# Patient Record
Sex: FEMALE | Race: WHITE | NOT HISPANIC OR LATINO | URBAN - METROPOLITAN AREA
[De-identification: names, ages, dates, MRNs, and addresses within clinical notes are randomized per-mention and may not be internally consistent; named-entity substitution may affect disease eponyms.]

---

## 2018-06-22 NOTE — PATIENT PROFILE ADULT. - PSH
History of cholecystectomy  delivery delivered    History of cholecystectomy    S/P bilateral breast reduction

## 2018-06-25 ENCOUNTER — INPATIENT (INPATIENT)
Facility: HOSPITAL | Age: 48
LOS: 0 days | Discharge: HOME | DRG: 621 | End: 2018-06-26
Attending: SURGERY | Admitting: SURGERY
Payer: COMMERCIAL

## 2018-06-25 VITALS
SYSTOLIC BLOOD PRESSURE: 143 MMHG | OXYGEN SATURATION: 100 % | HEART RATE: 115 BPM | TEMPERATURE: 98 F | RESPIRATION RATE: 18 BRPM | DIASTOLIC BLOOD PRESSURE: 81 MMHG | HEIGHT: 63 IN | WEIGHT: 282.85 LBS

## 2018-06-25 DIAGNOSIS — Z90.49 ACQUIRED ABSENCE OF OTHER SPECIFIED PARTS OF DIGESTIVE TRACT: Chronic | ICD-10-CM

## 2018-06-25 DIAGNOSIS — Z98.890 OTHER SPECIFIED POSTPROCEDURAL STATES: Chronic | ICD-10-CM

## 2018-06-25 LAB
BLD GP AB SCN SERPL QL: NEGATIVE — SIGNIFICANT CHANGE UP
GLUCOSE BLDC GLUCOMTR-MCNC: 106 MG/DL — HIGH (ref 70–99)
GLUCOSE BLDC GLUCOMTR-MCNC: 128 MG/DL — HIGH (ref 70–99)
GLUCOSE BLDC GLUCOMTR-MCNC: 145 MG/DL — HIGH (ref 70–99)
GLUCOSE BLDC GLUCOMTR-MCNC: 156 MG/DL — HIGH (ref 70–99)
HCT VFR BLD CALC: 35.6 % — SIGNIFICANT CHANGE UP (ref 34.5–45)
HGB BLD-MCNC: 11.5 G/DL — SIGNIFICANT CHANGE UP (ref 11.5–15.5)
MCHC RBC-ENTMCNC: 26.8 PG — LOW (ref 27–34)
MCHC RBC-ENTMCNC: 32.3 G/DL — SIGNIFICANT CHANGE UP (ref 32–36)
MCV RBC AUTO: 83 FL — SIGNIFICANT CHANGE UP (ref 80–100)
PLATELET # BLD AUTO: 379 K/UL — SIGNIFICANT CHANGE UP (ref 150–400)
RBC # BLD: 4.29 M/UL — SIGNIFICANT CHANGE UP (ref 3.8–5.2)
RBC # FLD: 13.5 % — SIGNIFICANT CHANGE UP (ref 10.3–16.9)
RH IG SCN BLD-IMP: POSITIVE — SIGNIFICANT CHANGE UP
WBC # BLD: 12.8 K/UL — HIGH (ref 3.8–10.5)
WBC # FLD AUTO: 12.8 K/UL — HIGH (ref 3.8–10.5)

## 2018-06-25 RX ORDER — GLUCAGON INJECTION, SOLUTION 0.5 MG/.1ML
1 INJECTION, SOLUTION SUBCUTANEOUS ONCE
Qty: 0 | Refills: 0 | Status: DISCONTINUED | OUTPATIENT
Start: 2018-06-25 | End: 2018-06-25

## 2018-06-25 RX ORDER — DEXTROSE 50 % IN WATER 50 %
15 SYRINGE (ML) INTRAVENOUS ONCE
Qty: 0 | Refills: 0 | Status: DISCONTINUED | OUTPATIENT
Start: 2018-06-25 | End: 2018-06-25

## 2018-06-25 RX ORDER — DEXTROSE 50 % IN WATER 50 %
15 SYRINGE (ML) INTRAVENOUS ONCE
Qty: 0 | Refills: 0 | Status: DISCONTINUED | OUTPATIENT
Start: 2018-06-25 | End: 2018-06-26

## 2018-06-25 RX ORDER — DEXTROSE 50 % IN WATER 50 %
25 SYRINGE (ML) INTRAVENOUS ONCE
Qty: 0 | Refills: 0 | Status: DISCONTINUED | OUTPATIENT
Start: 2018-06-25 | End: 2018-06-26

## 2018-06-25 RX ORDER — DEXTROSE 50 % IN WATER 50 %
25 SYRINGE (ML) INTRAVENOUS ONCE
Qty: 0 | Refills: 0 | Status: DISCONTINUED | OUTPATIENT
Start: 2018-06-25 | End: 2018-06-25

## 2018-06-25 RX ORDER — SODIUM CHLORIDE 9 MG/ML
1000 INJECTION, SOLUTION INTRAVENOUS
Qty: 0 | Refills: 0 | Status: DISCONTINUED | OUTPATIENT
Start: 2018-06-25 | End: 2018-06-26

## 2018-06-25 RX ORDER — DEXTROSE 50 % IN WATER 50 %
12.5 SYRINGE (ML) INTRAVENOUS ONCE
Qty: 0 | Refills: 0 | Status: DISCONTINUED | OUTPATIENT
Start: 2018-06-25 | End: 2018-06-26

## 2018-06-25 RX ORDER — HYDROMORPHONE HYDROCHLORIDE 2 MG/ML
0.5 INJECTION INTRAMUSCULAR; INTRAVENOUS; SUBCUTANEOUS EVERY 4 HOURS
Qty: 0 | Refills: 0 | Status: DISCONTINUED | OUTPATIENT
Start: 2018-06-25 | End: 2018-06-26

## 2018-06-25 RX ORDER — DEXTROSE 50 % IN WATER 50 %
12.5 SYRINGE (ML) INTRAVENOUS ONCE
Qty: 0 | Refills: 0 | Status: DISCONTINUED | OUTPATIENT
Start: 2018-06-25 | End: 2018-06-25

## 2018-06-25 RX ORDER — ONDANSETRON 8 MG/1
4 TABLET, FILM COATED ORAL EVERY 6 HOURS
Qty: 0 | Refills: 0 | Status: DISCONTINUED | OUTPATIENT
Start: 2018-06-25 | End: 2018-06-26

## 2018-06-25 RX ORDER — PANTOPRAZOLE SODIUM 20 MG/1
40 TABLET, DELAYED RELEASE ORAL EVERY 24 HOURS
Qty: 0 | Refills: 0 | Status: DISCONTINUED | OUTPATIENT
Start: 2018-06-25 | End: 2018-06-26

## 2018-06-25 RX ORDER — AMLODIPINE BESYLATE 2.5 MG/1
10 TABLET ORAL DAILY
Qty: 0 | Refills: 0 | Status: DISCONTINUED | OUTPATIENT
Start: 2018-06-25 | End: 2018-06-26

## 2018-06-25 RX ORDER — AMLODIPINE BESYLATE 2.5 MG/1
1 TABLET ORAL
Qty: 0 | Refills: 0 | COMMUNITY

## 2018-06-25 RX ORDER — ENOXAPARIN SODIUM 100 MG/ML
40 INJECTION SUBCUTANEOUS ONCE
Qty: 0 | Refills: 0 | Status: COMPLETED | OUTPATIENT
Start: 2018-06-25 | End: 2018-06-25

## 2018-06-25 RX ORDER — SCOPALAMINE 1 MG/3D
1 PATCH, EXTENDED RELEASE TRANSDERMAL ONCE
Qty: 0 | Refills: 0 | Status: COMPLETED | OUTPATIENT
Start: 2018-06-25 | End: 2018-06-25

## 2018-06-25 RX ORDER — HYDROMORPHONE HYDROCHLORIDE 2 MG/ML
1 INJECTION INTRAMUSCULAR; INTRAVENOUS; SUBCUTANEOUS EVERY 4 HOURS
Qty: 0 | Refills: 0 | Status: DISCONTINUED | OUTPATIENT
Start: 2018-06-25 | End: 2018-06-26

## 2018-06-25 RX ORDER — LABETALOL HCL 100 MG
10 TABLET ORAL ONCE
Qty: 0 | Refills: 0 | Status: COMPLETED | OUTPATIENT
Start: 2018-06-25 | End: 2018-06-25

## 2018-06-25 RX ORDER — SODIUM CHLORIDE 9 MG/ML
1000 INJECTION, SOLUTION INTRAVENOUS
Qty: 0 | Refills: 0 | Status: DISCONTINUED | OUTPATIENT
Start: 2018-06-25 | End: 2018-06-25

## 2018-06-25 RX ORDER — GLUCAGON INJECTION, SOLUTION 0.5 MG/.1ML
1 INJECTION, SOLUTION SUBCUTANEOUS ONCE
Qty: 0 | Refills: 0 | Status: DISCONTINUED | OUTPATIENT
Start: 2018-06-25 | End: 2018-06-26

## 2018-06-25 RX ORDER — BUPIVACAINE 13.3 MG/ML
20 INJECTION, SUSPENSION, LIPOSOMAL INFILTRATION ONCE
Qty: 0 | Refills: 0 | Status: DISCONTINUED | OUTPATIENT
Start: 2018-06-25 | End: 2018-06-25

## 2018-06-25 RX ORDER — AMLODIPINE BESYLATE 2.5 MG/1
10 TABLET ORAL DAILY
Qty: 0 | Refills: 0 | Status: DISCONTINUED | OUTPATIENT
Start: 2018-06-25 | End: 2018-06-25

## 2018-06-25 RX ORDER — INSULIN LISPRO 100/ML
VIAL (ML) SUBCUTANEOUS
Qty: 0 | Refills: 0 | Status: DISCONTINUED | OUTPATIENT
Start: 2018-06-25 | End: 2018-06-26

## 2018-06-25 RX ORDER — INSULIN LISPRO 100/ML
VIAL (ML) SUBCUTANEOUS
Qty: 0 | Refills: 0 | Status: DISCONTINUED | OUTPATIENT
Start: 2018-06-25 | End: 2018-06-25

## 2018-06-25 RX ORDER — HEPARIN SODIUM 5000 [USP'U]/ML
5000 INJECTION INTRAVENOUS; SUBCUTANEOUS EVERY 8 HOURS
Qty: 0 | Refills: 0 | Status: DISCONTINUED | OUTPATIENT
Start: 2018-06-26 | End: 2018-06-26

## 2018-06-25 RX ADMIN — HYDROMORPHONE HYDROCHLORIDE 0.5 MILLIGRAM(S): 2 INJECTION INTRAMUSCULAR; INTRAVENOUS; SUBCUTANEOUS at 22:20

## 2018-06-25 RX ADMIN — PANTOPRAZOLE SODIUM 40 MILLIGRAM(S): 20 TABLET, DELAYED RELEASE ORAL at 13:13

## 2018-06-25 RX ADMIN — Medication 2: at 12:54

## 2018-06-25 RX ADMIN — HYDROMORPHONE HYDROCHLORIDE 0.5 MILLIGRAM(S): 2 INJECTION INTRAMUSCULAR; INTRAVENOUS; SUBCUTANEOUS at 17:43

## 2018-06-25 RX ADMIN — SCOPALAMINE 1 PATCH: 1 PATCH, EXTENDED RELEASE TRANSDERMAL at 07:58

## 2018-06-25 RX ADMIN — Medication 10 MILLIGRAM(S): at 14:34

## 2018-06-25 RX ADMIN — AMLODIPINE BESYLATE 10 MILLIGRAM(S): 2.5 TABLET ORAL at 17:43

## 2018-06-25 RX ADMIN — HYDROMORPHONE HYDROCHLORIDE 0.5 MILLIGRAM(S): 2 INJECTION INTRAMUSCULAR; INTRAVENOUS; SUBCUTANEOUS at 12:34

## 2018-06-25 RX ADMIN — HYDROMORPHONE HYDROCHLORIDE 0.5 MILLIGRAM(S): 2 INJECTION INTRAMUSCULAR; INTRAVENOUS; SUBCUTANEOUS at 18:00

## 2018-06-25 RX ADMIN — HYDROMORPHONE HYDROCHLORIDE 0.5 MILLIGRAM(S): 2 INJECTION INTRAMUSCULAR; INTRAVENOUS; SUBCUTANEOUS at 12:57

## 2018-06-25 RX ADMIN — Medication 10 MILLIGRAM(S): at 14:00

## 2018-06-25 RX ADMIN — SODIUM CHLORIDE 200 MILLILITER(S): 9 INJECTION, SOLUTION INTRAVENOUS at 13:34

## 2018-06-25 RX ADMIN — HYDROMORPHONE HYDROCHLORIDE 0.5 MILLIGRAM(S): 2 INJECTION INTRAMUSCULAR; INTRAVENOUS; SUBCUTANEOUS at 22:07

## 2018-06-25 RX ADMIN — ENOXAPARIN SODIUM 40 MILLIGRAM(S): 100 INJECTION SUBCUTANEOUS at 07:58

## 2018-06-25 NOTE — BRIEF OPERATIVE NOTE - PROCEDURE
<<-----Click on this checkbox to enter Procedure Laparoscopic sleeve gastrectomy  06/25/2018    Active  ENORTHFIEL

## 2018-06-25 NOTE — H&P ADULT - ASSESSMENT
47y female with PMH HTN, DM with BMI 50 s/p elective sleeve gastrectomy.     Preop hgb 12.3  Pain/nausea control  BCLD/IVF  UGI series (Date)  VTE ppx  - Heparin subQ q8 at night  OOB/Amb/SCDs  CBC 6 hours postop  Nutrition Consult  f/u AM labs

## 2018-06-25 NOTE — H&P ADULT - HISTORY OF PRESENT ILLNESS
47y female PMH HTN, DM who has failed weight loss with long term dieting and exercise. She is here for elective sleeve gastrectomy. She states she feels well today.     PMH: HTN, DM    Medications: Amlodipine, Metformin    PShx: Breast reduction, CS, Cholecystectomy     NKDA, shellfish - itchy, respiratory distress    Fhx: Mother - DM    Never smoker, never drinker, no drug use    ROS: o/w negative    PHYSICAL EXAM:  GENERAL:  NAD  NEURO: AOx3. No focal deficit.   HEENT: AT, NC, sclera anicteric, mucosa moist  CV: RRR, no MRG  RESP: CTAB, no wheezes/rales/rhonchi   GI: soft, ND, NT, +BS, no guarding or rebound  : voiding	  MUSC: strength, sensation grossly intact.   Ext: No cyanosis, edema noted. Pulses 2+ bilateral UE/LE.

## 2018-06-25 NOTE — PROGRESS NOTE ADULT - SUBJECTIVE AND OBJECTIVE BOX
Surgery Post-Op Note, PCN:     Pre-Op Dx: E66.01  Morbid obesity    Procedure: Laparoscopic sleeve gastrectomy    Surgeon: Trevon    Subjective: Patient seen by bedside. Has not urinated yet. Pain well-controlled. Denies nausea, vomiting, chest pain, shortness of breath, fevers, or chills.      Vital Signs Last 24 Hrs  T(C): 36.3 (25 Jun 2018 14:18), Max: 37.3 (25 Jun 2018 12:05)  T(F): 97.4 (25 Jun 2018 14:18), Max: 99.1 (25 Jun 2018 12:05)  HR: 90 (25 Jun 2018 14:18) (86 - 115)  BP: 158/84 (25 Jun 2018 14:18) (135/82 - 166/88)  BP(mean): 105 (25 Jun 2018 13:57) (99 - 114)  RR: 18 (25 Jun 2018 14:18) (15 - 28)  SpO2: 99% (25 Jun 2018 14:18) (94% - 100%)    Physical Exam:  Gen:  NAD, resting comfortably, obese  Pulm:  no respiratory distress, nonlabored breathing  C/V: normal sinus rhythm, RRR  Abd: soft, NT/ND. obese, lap incision CDI  Extrem: WWP, non-edematous, scds in place        LABS:            CAPILLARY BLOOD GLUCOSE      POCT Blood Glucose.: 156 mg/dL (25 Jun 2018 12:29)  POCT Blood Glucose.: 106 mg/dL (25 Jun 2018 07:32)          Radiology and Additional Studies:

## 2018-06-25 NOTE — PROGRESS NOTE ADULT - ASSESSMENT
47y female PMH HTN, DM who has failed weight loss with long term dieting and exercise. Now s/p laparoscopic sleeve gastrectomy and hiatal hernia repair    Pain/nausea control  BCLD/IVF  VTE ppx  - Asa81 x 30 days  OOB/Amb/SCDs  f/u AM labs    Discharge w/:  - Colace 100 mg BID  - Protonix 40 mg qd  - Percocet 20 tabs  - Levsin 0.125 mg q4h for 7 days  - Zofran 4 mg BID for 7 days

## 2018-06-26 VITALS — WEIGHT: 282.85 LBS

## 2018-06-26 LAB
ANION GAP SERPL CALC-SCNC: 17 MMOL/L — SIGNIFICANT CHANGE UP (ref 5–17)
BUN SERPL-MCNC: 5 MG/DL — LOW (ref 7–23)
CALCIUM SERPL-MCNC: 9.3 MG/DL — SIGNIFICANT CHANGE UP (ref 8.4–10.5)
CHLORIDE SERPL-SCNC: 98 MMOL/L — SIGNIFICANT CHANGE UP (ref 96–108)
CO2 SERPL-SCNC: 21 MMOL/L — LOW (ref 22–31)
CREAT SERPL-MCNC: 0.54 MG/DL — SIGNIFICANT CHANGE UP (ref 0.5–1.3)
GLUCOSE BLDC GLUCOMTR-MCNC: 125 MG/DL — HIGH (ref 70–99)
GLUCOSE BLDC GLUCOMTR-MCNC: 133 MG/DL — HIGH (ref 70–99)
GLUCOSE SERPL-MCNC: 124 MG/DL — HIGH (ref 70–99)
HBA1C BLD-MCNC: 5.7 % — HIGH (ref 4–5.6)
HCT VFR BLD CALC: 35.4 % — SIGNIFICANT CHANGE UP (ref 34.5–45)
HGB BLD-MCNC: 11.5 G/DL — SIGNIFICANT CHANGE UP (ref 11.5–15.5)
MAGNESIUM SERPL-MCNC: 1.9 MG/DL — SIGNIFICANT CHANGE UP (ref 1.6–2.6)
MCHC RBC-ENTMCNC: 26.7 PG — LOW (ref 27–34)
MCHC RBC-ENTMCNC: 32.5 G/DL — SIGNIFICANT CHANGE UP (ref 32–36)
MCV RBC AUTO: 82.1 FL — SIGNIFICANT CHANGE UP (ref 80–100)
PHOSPHATE SERPL-MCNC: 2.3 MG/DL — LOW (ref 2.5–4.5)
PLATELET # BLD AUTO: 371 K/UL — SIGNIFICANT CHANGE UP (ref 150–400)
POTASSIUM SERPL-MCNC: 3.4 MMOL/L — LOW (ref 3.5–5.3)
POTASSIUM SERPL-SCNC: 3.4 MMOL/L — LOW (ref 3.5–5.3)
RBC # BLD: 4.31 M/UL — SIGNIFICANT CHANGE UP (ref 3.8–5.2)
RBC # FLD: 13.5 % — SIGNIFICANT CHANGE UP (ref 10.3–16.9)
SODIUM SERPL-SCNC: 136 MMOL/L — SIGNIFICANT CHANGE UP (ref 135–145)
WBC # BLD: 10.6 K/UL — HIGH (ref 3.8–10.5)
WBC # FLD AUTO: 10.6 K/UL — HIGH (ref 3.8–10.5)

## 2018-06-26 PROCEDURE — C1781: CPT

## 2018-06-26 PROCEDURE — 80048 BASIC METABOLIC PNL TOTAL CA: CPT

## 2018-06-26 PROCEDURE — 36415 COLL VENOUS BLD VENIPUNCTURE: CPT

## 2018-06-26 PROCEDURE — 86850 RBC ANTIBODY SCREEN: CPT

## 2018-06-26 PROCEDURE — 83735 ASSAY OF MAGNESIUM: CPT

## 2018-06-26 PROCEDURE — 88307 TISSUE EXAM BY PATHOLOGIST: CPT

## 2018-06-26 PROCEDURE — 84100 ASSAY OF PHOSPHORUS: CPT

## 2018-06-26 PROCEDURE — C1889: CPT

## 2018-06-26 PROCEDURE — 82962 GLUCOSE BLOOD TEST: CPT

## 2018-06-26 PROCEDURE — 85027 COMPLETE CBC AUTOMATED: CPT

## 2018-06-26 PROCEDURE — 83036 HEMOGLOBIN GLYCOSYLATED A1C: CPT

## 2018-06-26 PROCEDURE — 86901 BLOOD TYPING SEROLOGIC RH(D): CPT

## 2018-06-26 PROCEDURE — 86900 BLOOD TYPING SEROLOGIC ABO: CPT

## 2018-06-26 PROCEDURE — C9399: CPT

## 2018-06-26 RX ORDER — METFORMIN HYDROCHLORIDE 850 MG/1
0 TABLET ORAL
Qty: 0 | Refills: 0 | COMMUNITY

## 2018-06-26 RX ORDER — SODIUM,POTASSIUM PHOSPHATES 278-250MG
1 POWDER IN PACKET (EA) ORAL
Qty: 0 | Refills: 0 | Status: DISCONTINUED | OUTPATIENT
Start: 2018-06-26 | End: 2018-06-26

## 2018-06-26 RX ORDER — PANTOPRAZOLE SODIUM 20 MG/1
1 TABLET, DELAYED RELEASE ORAL
Qty: 30 | Refills: 0 | OUTPATIENT
Start: 2018-06-26 | End: 2018-07-25

## 2018-06-26 RX ORDER — HYOSCYAMINE SULFATE 0.13 MG
1 TABLET ORAL
Qty: 20 | Refills: 0 | OUTPATIENT
Start: 2018-06-26 | End: 2018-06-30

## 2018-06-26 RX ORDER — ASPIRIN/CALCIUM CARB/MAGNESIUM 324 MG
1 TABLET ORAL
Qty: 30 | Refills: 0 | OUTPATIENT
Start: 2018-06-26 | End: 2018-07-25

## 2018-06-26 RX ORDER — HYOSCYAMINE SULFATE 0.13 MG
0.12 TABLET ORAL EVERY 4 HOURS
Qty: 0 | Refills: 0 | Status: DISCONTINUED | OUTPATIENT
Start: 2018-06-26 | End: 2018-06-26

## 2018-06-26 RX ORDER — OXYCODONE AND ACETAMINOPHEN 5; 325 MG/1; MG/1
1 TABLET ORAL EVERY 4 HOURS
Qty: 0 | Refills: 0 | Status: DISCONTINUED | OUTPATIENT
Start: 2018-06-26 | End: 2018-06-26

## 2018-06-26 RX ORDER — DOCUSATE SODIUM 100 MG
1 CAPSULE ORAL
Qty: 10 | Refills: 0 | OUTPATIENT
Start: 2018-06-26 | End: 2018-06-30

## 2018-06-26 RX ORDER — OXYCODONE AND ACETAMINOPHEN 5; 325 MG/1; MG/1
2 TABLET ORAL EVERY 4 HOURS
Qty: 0 | Refills: 0 | Status: DISCONTINUED | OUTPATIENT
Start: 2018-06-26 | End: 2018-06-26

## 2018-06-26 RX ORDER — SODIUM CHLORIDE 9 MG/ML
1000 INJECTION, SOLUTION INTRAVENOUS
Qty: 0 | Refills: 0 | Status: DISCONTINUED | OUTPATIENT
Start: 2018-06-26 | End: 2018-06-26

## 2018-06-26 RX ORDER — ONDANSETRON 8 MG/1
1 TABLET, FILM COATED ORAL
Qty: 12 | Refills: 0 | OUTPATIENT
Start: 2018-06-26 | End: 2018-06-29

## 2018-06-26 RX ADMIN — HEPARIN SODIUM 5000 UNIT(S): 5000 INJECTION INTRAVENOUS; SUBCUTANEOUS at 13:22

## 2018-06-26 RX ADMIN — ONDANSETRON 4 MILLIGRAM(S): 8 TABLET, FILM COATED ORAL at 06:26

## 2018-06-26 RX ADMIN — AMLODIPINE BESYLATE 10 MILLIGRAM(S): 2.5 TABLET ORAL at 06:17

## 2018-06-26 RX ADMIN — HEPARIN SODIUM 5000 UNIT(S): 5000 INJECTION INTRAVENOUS; SUBCUTANEOUS at 06:17

## 2018-06-26 RX ADMIN — HYDROMORPHONE HYDROCHLORIDE 0.5 MILLIGRAM(S): 2 INJECTION INTRAMUSCULAR; INTRAVENOUS; SUBCUTANEOUS at 06:35

## 2018-06-26 RX ADMIN — HYDROMORPHONE HYDROCHLORIDE 0.5 MILLIGRAM(S): 2 INJECTION INTRAMUSCULAR; INTRAVENOUS; SUBCUTANEOUS at 06:17

## 2018-06-26 RX ADMIN — Medication 0.12 MILLIGRAM(S): at 11:19

## 2018-06-26 RX ADMIN — Medication 1 TABLET(S): at 11:20

## 2018-06-26 NOTE — DISCHARGE NOTE ADULT - MEDICATION SUMMARY - MEDICATIONS TO TAKE
I will START or STAY ON the medications listed below when I get home from the hospital:    aspirin 81 mg oral tablet, chewable  -- 1 tab(s) chewed once a day MDD:1  -- Chew tablets before swallowing  Take with food or milk.    -- Indication: For Postoperative antiplatelet     Percocet 5/325 oral tablet  -- 1 tab(s) by mouth every 6 hours, As Needed -for severe pain MDD:4   -- Caution federal law prohibits the transfer of this drug to any person other  than the person for whom it was prescribed.  May cause drowsiness.  Alcohol may intensify this effect.  Use care when operating dangerous machinery.  This prescription cannot be refilled.  This product contains acetaminophen.  Do not use  with any other product containing acetaminophen to prevent possible liver damage.  Using more of this medication than prescribed may cause serious breathing problems.    -- Indication: For Postoperative pain    amLODIPine 10 mg oral tablet  -- 1 tab(s) by mouth once a day  -- Indication: For Hypertension    Levsin SL 0.125 mg sublingual tablet  -- 1 tab(s) sublingually every 6 hours x 5 days MDD:4  -- May cause drowsiness.  Alcohol may intensify this effect.  Use care when operating dangerous machinery.    -- Indication: For Postoperative    Colace 100 mg oral capsule  -- 1 cap(s) by mouth 2 times a day, As needed for constipation     -- Medication should be taken with plenty of water.    -- Indication: For Postoperative constipation     Protonix 40 mg oral delayed release tablet  -- 1 tab(s) by mouth once a day MDD:1  -- It is very important that you take or use this exactly as directed.  Do not skip doses or discontinue unless directed by your doctor.  Obtain medical advice before taking any non-prescription drugs as some may affect the action of this medication.  Swallow whole.  Do not crush.    -- Indication: For Postoperative acid reflux I will START or STAY ON the medications listed below when I get home from the hospital:    aspirin 81 mg oral tablet, chewable  -- 1 tab(s) chewed once a day MDD:1  -- Chew tablets before swallowing  Take with food or milk.    -- Indication: For Postoperative antiplatelet     Percocet 5/325 oral tablet  -- 1 tab(s) by mouth every 6 hours, As Needed -for severe pain MDD:4   -- Caution federal law prohibits the transfer of this drug to any person other  than the person for whom it was prescribed.  May cause drowsiness.  Alcohol may intensify this effect.  Use care when operating dangerous machinery.  This prescription cannot be refilled.  This product contains acetaminophen.  Do not use  with any other product containing acetaminophen to prevent possible liver damage.  Using more of this medication than prescribed may cause serious breathing problems.    -- Indication: For Postoperative pain    Zofran 4 mg oral tablet  -- 1 tab(s) by mouth every 8 hours, As Needed -for nausea MDD:3   -- Indication: For Postoperative nausea     amLODIPine 10 mg oral tablet  -- 1 tab(s) by mouth once a day  -- Indication: For Hypertension    Levsin SL 0.125 mg sublingual tablet  -- 1 tab(s) sublingually every 6 hours x 5 days MDD:4  -- May cause drowsiness.  Alcohol may intensify this effect.  Use care when operating dangerous machinery.    -- Indication: For Postoperative    Colace 100 mg oral capsule  -- 1 cap(s) by mouth 2 times a day, As needed for constipation     -- Medication should be taken with plenty of water.    -- Indication: For Postoperative constipation     Protonix 40 mg oral delayed release tablet  -- 1 tab(s) by mouth once a day MDD:1  -- It is very important that you take or use this exactly as directed.  Do not skip doses or discontinue unless directed by your doctor.  Obtain medical advice before taking any non-prescription drugs as some may affect the action of this medication.  Swallow whole.  Do not crush.    -- Indication: For Postoperative acid reflux

## 2018-06-26 NOTE — PROGRESS NOTE ADULT - ASSESSMENT
47y female PMH HTN, DM who has failed weight loss with long term dieting and exercise. Now s/p laparoscopic sleeve gastrectomy and hiatal hernia repair    Pain/nausea control  BCLD/IVF  VTE ppx  - Asa81 x 30 days  OOB/Amb/SCDs  f/u AM labs 47y female PMH HTN, DM who has failed weight loss with long term dieting and exercise. Now s/p laparoscopic sleeve gastrectomy and hiatal hernia repair    Pain/nausea control  BCLD/IVF  VTE ppx  Asa81 x 30 days  OOB/Amb/SCDs  Levsin  f/u AM labs

## 2018-06-26 NOTE — DIETITIAN INITIAL EVALUATION ADULT. - ENERGY NEEDS
Height: 5'3" Weight: 283lbs, IBW 115lbs+/-10%, %%, BMI 50  IBW used for calculations as pt >120% of IBW   Above energy needs calculated for wt maintenance (20-25kcal/kg).   Weeks 1-2 estimated needs: 523-627kcal/day (10-12kcal/kg), 78-110g pro/day (1.5-2.1g/kg), >/=64oz clear fluids.

## 2018-06-26 NOTE — DISCHARGE NOTE ADULT - PATIENT PORTAL LINK FT
You can access the [facility] Patient Portal, offered by [facility], by registering with the following website: [email or web address]

## 2018-06-26 NOTE — DISCHARGE NOTE ADULT - MEDICATION SUMMARY - MEDICATIONS TO STOP TAKING
I will STOP taking the medications listed below when I get home from the hospital:    metFORMIN 500 mg oral tablet  -- orally 3 times a day

## 2018-06-26 NOTE — DISCHARGE NOTE ADULT - HOSPITAL COURSE
47y female PMH HTN, DM who has failed weight loss with long term dieting and exercise here for elective bariatric procedure. Surgery   6/25: Laparoscopic sleeve gastrectomy and hiatal hernia repair.Pt tolerated the procedure well. At time of discharge, pt was tolerating a bariatric clear liquid diet, and pt's pain was controlled. Plan is to follow up with Dr. Lester in the office.

## 2018-06-26 NOTE — DIETITIAN INITIAL EVALUATION ADULT. - OTHER INFO
48yo F s/p lap sleeve gastrectomy and hiatal hernia repair. Currently on a BARICLLIQ diet and tolerating PO. Had 2 sips of broth and ice chips by ~10am. Encouraged aiming for 2-3oz over the next hour. Denies N/V at present. C/o gas pain. Prepared w/ appropriate protein supplements. Has not yet gotten vitamins. RD provided indepth edu on diet advancement process and specific nutrient needs s/p LSG. Pt receptive and expressed understanding. NKFA or dietary restrictions. Skin: surgical incision; GI WDL per flowsheet.

## 2018-06-26 NOTE — DISCHARGE NOTE ADULT - PLAN OF CARE
Recovery Follow up with Dr. Lester  in 1 week. Call the office at  to schedule your appointment. You may shower; soap and water over incision sites. Do not scrub. Pat dry when done. No tub bathing or swimming until cleared. Keep incision sites out of the sun as scars will darken. No heavy lifting (>10lbs) or strenuous exercise. Diet: Bariatric Full Fluids. 60 grams protein daily.  64 fluid ounces water daily. Drink small sips throughout the day. Continue diet as outlined by paperwork received as a pre-operative patient. You should be urinating at least 3-4x per day. Call the office if you experience increasing abdominal pain, nausea, vomiting, or temperature >100.4F.  NO NSAIDs until approved by Dr. Lester  . Avoid alcoholic beverages until cleared by Dr. Lester.

## 2018-06-26 NOTE — DISCHARGE NOTE ADULT - CARE PROVIDER_API CALL
Leydi Lester), Surgery; Surgical Critical Care  [address] [facility] [address] [address]  Phone: [phone]  Fax: [phone]

## 2018-06-26 NOTE — DISCHARGE NOTE ADULT - CARE PLAN
Principal Discharge DX:	Morbid obesity  Goal:	Recovery  Assessment and plan of treatment:	Follow up with Dr. Lester  in 1 week. Call the office at  to schedule your appointment. You may shower; soap and water over incision sites. Do not scrub. Pat dry when done. No tub bathing or swimming until cleared. Keep incision sites out of the sun as scars will darken. No heavy lifting (>10lbs) or strenuous exercise. Diet: Bariatric Full Fluids. 60 grams protein daily.  64 fluid ounces water daily. Drink small sips throughout the day. Continue diet as outlined by paperwork received as a pre-operative patient. You should be urinating at least 3-4x per day. Call the office if you experience increasing abdominal pain, nausea, vomiting, or temperature >100.4F.  NO NSAIDs until approved by Dr. Lester  . Avoid alcoholic beverages until cleared by Dr. Lester.

## 2018-06-26 NOTE — PROGRESS NOTE ADULT - SUBJECTIVE AND OBJECTIVE BOX
O/N: passed TOV, post op H/H: 11.5/35.6, juanpablo sips  6/25: OR for LSG; POC normal;     POD 1 LSG + hiatal hernia repair OVERNIGHT EVENTS:   passed TOV, post op H/H: 11.5/35.6, juanpablo sips  6/25: OR for LSG; POC normal;     POD# 1 LSG + hiatal hernia repair        SUBJECTIVE: Patient examined bedside complains of incisional pain  Flatus: [] YES [X] NO             Bowel Movement: [ ] YES [X ] NO  Pain (0-10):            Pain Control Adequate: [X ] YES [ ] NO  Nausea: [ ] YES [X ] NO            Vomiting: [ ] YES [X ] NO  Diarrhea: [ ] YES [X ] NO         Constipation: [ ] YES [X ] NO     Chest Pain: [ ] YES [X ] NO    SOB:  [ ] YES [X ] NO    amLODIPine   Tablet 10 milliGRAM(s) Oral daily  heparin  Injectable 5000 Unit(s) SubCutaneous every 8 hours      Vital Signs Last 24 Hrs  T(C): 37.2 (26 Jun 2018 05:31), Max: 37.3 (25 Jun 2018 12:05)  T(F): 99 (26 Jun 2018 05:31), Max: 99.1 (25 Jun 2018 12:05)  HR: 78 (26 Jun 2018 05:31) (78 - 104)  BP: 164/80 (26 Jun 2018 05:31) (132/79 - 166/88)  BP(mean): 96 (25 Jun 2018 14:41) (96 - 114)  RR: 17 (26 Jun 2018 05:31) (15 - 28)  SpO2: 96% (26 Jun 2018 05:31) (94% - 100%)  I&O's Detail    25 Jun 2018 07:01  -  26 Jun 2018 07:00  --------------------------------------------------------  IN:    lactated ringers.: 3600 mL    Oral Fluid: 120 mL  Total IN: 3720 mL    OUT:    Voided: 4050 mL  Total OUT: 4050 mL    Total NET: -330 mL      26 Jun 2018 07:01  -  26 Jun 2018 08:14  --------------------------------------------------------  IN:    lactated ringers.: 200 mL  Total IN: 200 mL    OUT:  Total OUT: 0 mL    Total NET: 200 mL          General: NAD, resting comfortably in bed  C/V: NSR  Pulm: Nonlabored breathing, no respiratory distress  Abd: Soft, non- distended, TTP around incision site, incision clean dry and intact   Extrem: WWP, no edema, SCDs in place      LABS:                        11.5   10.6  )-----------( 371      ( 26 Jun 2018 07:26 )             35.4     06-26    136  |  98  |  5<L>  ----------------------------<  124<H>  3.4<L>   |  21<L>  |  0.54    Ca    9.3      26 Jun 2018 07:26  Phos  2.3     06-26  Mg     1.9     06-26

## 2018-06-28 DIAGNOSIS — Z98.890 OTHER SPECIFIED POSTPROCEDURAL STATES: ICD-10-CM

## 2018-06-28 DIAGNOSIS — E66.01 MORBID (SEVERE) OBESITY DUE TO EXCESS CALORIES: ICD-10-CM

## 2018-06-28 DIAGNOSIS — K44.9 DIAPHRAGMATIC HERNIA WITHOUT OBSTRUCTION OR GANGRENE: ICD-10-CM

## 2018-06-28 DIAGNOSIS — E11.9 TYPE 2 DIABETES MELLITUS WITHOUT COMPLICATIONS: ICD-10-CM

## 2018-06-28 DIAGNOSIS — Z90.49 ACQUIRED ABSENCE OF OTHER SPECIFIED PARTS OF DIGESTIVE TRACT: ICD-10-CM

## 2018-06-28 DIAGNOSIS — Z91.013 ALLERGY TO SEAFOOD: ICD-10-CM

## 2018-06-28 DIAGNOSIS — I10 ESSENTIAL (PRIMARY) HYPERTENSION: ICD-10-CM

## 2018-07-02 LAB — SURGICAL PATHOLOGY STUDY: SIGNIFICANT CHANGE UP

## 2019-01-01 NOTE — BRIEF OPERATIVE NOTE - OPERATION/FINDINGS
**incomplete Laparoscopic sleeve gastrectomy performed with hiatal hernia repair, primarily. Staple line reinforced with [place] Seamguard and interrupted sutures. Hemostasis achieved. Fascial defect for 12mm port repair primarily and skin incisions for ports repair primarily. clear

## 2019-06-20 NOTE — DISCHARGE NOTE ADULT - PROVIDER RX CONTACT NUMBER
[phone] Composite Graft Text: The defect edges were debeveled with a #15 scalpel blade.  Given the location of the defect, shape of the defect, the proximity to free margins and the fact the defect was full thickness a composite graft was deemed most appropriate.  The defect was outline and then transferred to the donor site.  A full thickness graft was then excised from the donor site. The graft was then placed in the primary defect, oriented appropriately and then sutured into place.  The secondary defect was then repaired using a primary closure.

## 2022-06-01 NOTE — DISCHARGE NOTE ADULT - IF YOU ARE A SMOKER, IT IS IMPORTANT FOR YOUR HEALTH TO STOP SMOKING. PLEASE BE AWARE THAT SECOND HAND SMOKE IS ALSO HARMFUL.
Statement Selected Smoking even a single puff increases the likelihood of a full relapse, withdrawal symptoms peak within 1-2 weeks, but can persist for months

## 2024-09-02 NOTE — H&P ADULT - DOES THIS PATIENT HAVE A HISTORY OF OR HAS BEEN DX WITH HEART FAILURE?
Problem: Adult Mental Health  Goal: Reports or exhibits reduction in symptoms associated with elevated or labile mood/cherri  Outcome: Not met, plan adjusted     Problem: Adult Mental Health  Goal: Reports or exhibits improvement in depressive mood signs/symptoms  Outcome: Not met, plan adjusted     Problem: Adult Mental Health  Goal: Reports or exhibits an improvement in mood signs/symptoms associated with anxiety  Outcome: Not met, plan adjusted     Pt out in the milieu attends groups and doing puzzles. Pt is anxious to go home, pt complaints still feels \" Residual withdrawals like tremors, hot flashes. Pt wanting to be started on suboxone but willing to wait to get an answer from [facility] if she's going to be accepted. No Covid concerns at this time.    Psychotropic medication compliance: yes    Psychotropic medication side effects: none    Psychotropic PRN's given: none    Appetite and food intake: good    Sleep hours (if applicable):   no